# Patient Record
Sex: FEMALE | Race: WHITE | Employment: FULL TIME | URBAN - METROPOLITAN AREA
[De-identification: names, ages, dates, MRNs, and addresses within clinical notes are randomized per-mention and may not be internally consistent; named-entity substitution may affect disease eponyms.]

---

## 2024-11-19 ENCOUNTER — APPOINTMENT (OUTPATIENT)
Dept: PRIMARY CARE | Facility: CLINIC | Age: 54
End: 2024-11-19

## 2024-11-19 VITALS
HEIGHT: 62 IN | TEMPERATURE: 97.9 F | WEIGHT: 139.6 LBS | DIASTOLIC BLOOD PRESSURE: 80 MMHG | SYSTOLIC BLOOD PRESSURE: 124 MMHG | BODY MASS INDEX: 25.69 KG/M2 | HEART RATE: 81 BPM | OXYGEN SATURATION: 99 %

## 2024-11-19 DIAGNOSIS — J32.9 OTHER SINUSITIS, UNSPECIFIED CHRONICITY: ICD-10-CM

## 2024-11-19 DIAGNOSIS — F31.9 BIPOLAR 1 DISORDER (MULTI): ICD-10-CM

## 2024-11-19 DIAGNOSIS — M51.369 DEGENERATION OF INTERVERTEBRAL DISC OF LUMBAR REGION, UNSPECIFIED WHETHER PAIN PRESENT: ICD-10-CM

## 2024-11-19 DIAGNOSIS — F90.9 ATTENTION DEFICIT HYPERACTIVITY DISORDER (ADHD), UNSPECIFIED ADHD TYPE: ICD-10-CM

## 2024-11-19 DIAGNOSIS — E04.9 GOITER: ICD-10-CM

## 2024-11-19 DIAGNOSIS — G25.81 RESTLESS LEGS: ICD-10-CM

## 2024-11-19 DIAGNOSIS — J45.30 MILD PERSISTENT ASTHMA, UNSPECIFIED WHETHER COMPLICATED (HHS-HCC): ICD-10-CM

## 2024-11-19 DIAGNOSIS — Z00.00 WELLNESS EXAMINATION: ICD-10-CM

## 2024-11-19 DIAGNOSIS — Z78.0 MENOPAUSE: Primary | ICD-10-CM

## 2024-11-19 DIAGNOSIS — E03.9 ACQUIRED HYPOTHYROIDISM: ICD-10-CM

## 2024-11-19 PROBLEM — L50.8 CHRONIC URTICARIA: Status: ACTIVE | Noted: 2024-11-19

## 2024-11-19 PROCEDURE — 99213 OFFICE O/P EST LOW 20 MIN: CPT | Performed by: INTERNAL MEDICINE

## 2024-11-19 PROCEDURE — 99396 PREV VISIT EST AGE 40-64: CPT | Performed by: INTERNAL MEDICINE

## 2024-11-19 PROCEDURE — 3008F BODY MASS INDEX DOCD: CPT | Performed by: INTERNAL MEDICINE

## 2024-11-19 RX ORDER — CARBAMAZEPINE 400 MG/1
400 TABLET, EXTENDED RELEASE ORAL 2 TIMES DAILY
COMMUNITY
Start: 2023-01-20

## 2024-11-19 RX ORDER — GABAPENTIN 800 MG/1
800 TABLET ORAL 3 TIMES DAILY
COMMUNITY

## 2024-11-19 RX ORDER — AMOXICILLIN AND CLAVULANATE POTASSIUM 500; 125 MG/1; MG/1
500 TABLET, FILM COATED ORAL 3 TIMES DAILY
Qty: 21 TABLET | Refills: 0 | Status: SHIPPED | OUTPATIENT
Start: 2024-11-19 | End: 2024-11-26

## 2024-11-19 RX ORDER — ALBUTEROL SULFATE 90 UG/1
2 INHALANT RESPIRATORY (INHALATION) EVERY 4 HOURS PRN
COMMUNITY
Start: 2023-02-22

## 2024-11-19 RX ORDER — LEVOTHYROXINE SODIUM 100 UG/1
100 TABLET ORAL
COMMUNITY
Start: 2023-07-05

## 2024-11-19 RX ORDER — DEXTROAMPHETAMINE SACCHARATE, AMPHETAMINE ASPARTATE, DEXTROAMPHETAMINE SULFATE AND AMPHETAMINE SULFATE 5; 5; 5; 5 MG/1; MG/1; MG/1; MG/1
20 TABLET ORAL 3 TIMES DAILY
COMMUNITY

## 2024-11-19 ASSESSMENT — ENCOUNTER SYMPTOMS
LIGHT-HEADEDNESS: 0
ARTHRALGIAS: 0
ENDOCRINE NEGATIVE: 1
APPETITE CHANGE: 0
EYE REDNESS: 0
UNEXPECTED WEIGHT CHANGE: 0
PALPITATIONS: 0
APNEA: 0
FATIGUE: 0
EYE PAIN: 0
CHOKING: 0
SHORTNESS OF BREATH: 0
DIZZINESS: 0
NUMBNESS: 0
WHEEZING: 0
HEMATOLOGIC/LYMPHATIC NEGATIVE: 1
CHEST TIGHTNESS: 0
HEADACHES: 0
COUGH: 0
FACIAL ASYMMETRY: 0
DIAPHORESIS: 0
STRIDOR: 0
EYE ITCHING: 0
EYE DISCHARGE: 0
FEVER: 0
ALLERGIC/IMMUNOLOGIC NEGATIVE: 1
ACTIVITY CHANGE: 0
GASTROINTESTINAL NEGATIVE: 1
PSYCHIATRIC NEGATIVE: 1
PHOTOPHOBIA: 0
CHILLS: 0

## 2024-11-19 NOTE — PROGRESS NOTES
"Subjective   Patient ID: Parris Perla is a 54 y.o. female who presents for Establish Care.    HPI She is here to establish    Dr Gallo  Knees and hip pains '  Scoliosis     I have a sinus infection today     Pmhx   Goiter  Add  Asthma   Hypothyroid  Breast skin bx neg 2022    Pshx  Emani   Partial Hyst   Appy  Thyroid goiter   Head surgery    SOCHX      2 KIDS 28 24 H   DIET IS GOOD   DRINK FLUIDS   CARDIO NO   WEIGHTS NO   SLEEP IS UP AND DOWN   ALCOHOL social     Family hx  M a h   many sibs cancer   D d panc   Sis h     Pre med  Colonoscopy due    Mamm  due  Dexa due   Vaccines up to date   Derm  due   Pap due   Review of Systems   Constitutional:  Negative for activity change, appetite change, chills, diaphoresis, fatigue, fever and unexpected weight change.   HENT: Negative.     Eyes:  Negative for photophobia, pain, discharge, redness, itching and visual disturbance.   Respiratory:  Negative for apnea, cough, choking, chest tightness, shortness of breath, wheezing and stridor.    Cardiovascular:  Negative for chest pain, palpitations and leg swelling.   Gastrointestinal: Negative.    Endocrine: Negative.    Genitourinary: Negative.    Musculoskeletal:  Negative for arthralgias.   Skin: Negative.    Allergic/Immunologic: Negative.    Neurological:  Negative for dizziness, facial asymmetry, light-headedness, numbness and headaches.   Hematological: Negative.    Psychiatric/Behavioral: Negative.         Objective   /80 (BP Location: Right arm, Patient Position: Sitting, BP Cuff Size: Adult)   Pulse 81   Temp 36.6 °C (97.9 °F) (Temporal)   Ht 1.575 m (5' 2\")   Wt 63.3 kg (139 lb 9.6 oz)   SpO2 99%   BMI 25.53 kg/m²     Physical Exam  HENT:      Head: Normocephalic.      Right Ear: Tympanic membrane normal.      Nose: Nose normal.      Mouth/Throat:      Mouth: Mucous membranes are moist.   Eyes:      Pupils: Pupils are equal, round, and reactive to light.   Cardiovascular:      " Rate and Rhythm: Normal rate and regular rhythm.   Pulmonary:      Effort: Pulmonary effort is normal.   Abdominal:      General: Abdomen is flat. Bowel sounds are normal.   Musculoskeletal:         General: Normal range of motion.   Skin:     General: Skin is warm.      Capillary Refill: Capillary refill takes less than 2 seconds.   Neurological:      Mental Status: She is alert and oriented to person, place, and time.   Psychiatric:         Behavior: Behavior normal.     Slight goiter   Dental caries     Assessment/Plan   Diagnoses and all orders for this visit:  Menopause  -     BI mammo bilateral screening tomosynthesis; Future  -     XR DEXA bone density; Future  -     Referral to Gastroenterology; Future  -     US thyroid; Future  -     TSH with reflex to Free T4 if abnormal; Future  -     Anti-Thyroglobulin Antibody; Future  -     Comprehensive metabolic panel; Future  -     Urinalysis with Reflex Culture and Microscopic; Future  -     amoxicillin-pot clavulanate (Augmentin) 500-125 mg tablet; Take 1 tablet (500 mg) by mouth 3 times a day for 7 days.  -     Referral to Gynecology; Future  Mild persistent asthma, unspecified whether complicated ([facility])  Comments:  controlled  Orders:  -     BI mammo bilateral screening tomosynthesis; Future  -     Referral to Gastroenterology; Future  -     US thyroid; Future  -     TSH with reflex to Free T4 if abnormal; Future  -     Anti-Thyroglobulin Antibody; Future  -     Comprehensive metabolic panel; Future  -     Urinalysis with Reflex Culture and Microscopic; Future  -     amoxicillin-pot clavulanate (Augmentin) 500-125 mg tablet; Take 1 tablet (500 mg) by mouth 3 times a day for 7 days.  -     Referral to Gynecology; Future  Degeneration of intervertebral disc of lumbar region, unspecified whether pain present  Comments:  stable  Orders:  -     BI mammo bilateral screening tomosynthesis; Future  -     Referral to Gastroenterology; Future  -     US thyroid;  Future  -     TSH with reflex to Free T4 if abnormal; Future  -     Anti-Thyroglobulin Antibody; Future  -     Comprehensive metabolic panel; Future  -     Urinalysis with Reflex Culture and Microscopic; Future  -     amoxicillin-pot clavulanate (Augmentin) 500-125 mg tablet; Take 1 tablet (500 mg) by mouth 3 times a day for 7 days.  -     Referral to Gynecology; Future  Goiter  Comments:  lets do us  Orders:  -     BI mammo bilateral screening tomosynthesis; Future  -     Referral to Gastroenterology; Future  -     US thyroid; Future  -     TSH with reflex to Free T4 if abnormal; Future  -     Anti-Thyroglobulin Antibody; Future  -     Comprehensive metabolic panel; Future  -     Urinalysis with Reflex Culture and Microscopic; Future  -     amoxicillin-pot clavulanate (Augmentin) 500-125 mg tablet; Take 1 tablet (500 mg) by mouth 3 times a day for 7 days.  -     Referral to Gynecology; Future  Bipolar 1 disorder (Multi)  Comments:  controlled by psych  Orders:  -     BI mammo bilateral screening tomosynthesis; Future  -     Referral to Gastroenterology; Future  -     US thyroid; Future  -     TSH with reflex to Free T4 if abnormal; Future  -     Anti-Thyroglobulin Antibody; Future  -     Comprehensive metabolic panel; Future  -     Urinalysis with Reflex Culture and Microscopic; Future  -     amoxicillin-pot clavulanate (Augmentin) 500-125 mg tablet; Take 1 tablet (500 mg) by mouth 3 times a day for 7 days.  -     Referral to Gynecology; Future  Wellness examination  -     BI mammo bilateral screening tomosynthesis; Future  -     Referral to Gastroenterology; Future  -     US thyroid; Future  -     TSH with reflex to Free T4 if abnormal; Future  -     Anti-Thyroglobulin Antibody; Future  -     Lipid Panel; Future  -     Comprehensive metabolic panel; Future  -     Urinalysis with Reflex Culture and Microscopic; Future  -     amoxicillin-pot clavulanate (Augmentin) 500-125 mg tablet; Take 1 tablet (500 mg) by mouth  3 times a day for 7 days.  -     Referral to Gynecology; Future  Acquired hypothyroidism  Comments:  on meds  Orders:  -     BI mammo bilateral screening tomosynthesis; Future  -     Referral to Gastroenterology; Future  -     US thyroid; Future  -     TSH with reflex to Free T4 if abnormal; Future  -     Anti-Thyroglobulin Antibody; Future  -     Comprehensive metabolic panel; Future  -     Urinalysis with Reflex Culture and Microscopic; Future  -     amoxicillin-pot clavulanate (Augmentin) 500-125 mg tablet; Take 1 tablet (500 mg) by mouth 3 times a day for 7 days.  -     Referral to Gynecology; Future  Other sinusitis, unspecified chronicity  -     BI mammo bilateral screening tomosynthesis; Future  -     Referral to Gastroenterology; Future  -     US thyroid; Future  -     TSH with reflex to Free T4 if abnormal; Future  -     Anti-Thyroglobulin Antibody; Future  -     Comprehensive metabolic panel; Future  -     Urinalysis with Reflex Culture and Microscopic; Future  -     amoxicillin-pot clavulanate (Augmentin) 500-125 mg tablet; Take 1 tablet (500 mg) by mouth 3 times a day for 7 days.  -     Referral to Gynecology; Future  Restless legs  Comments:  controlled  Orders:  -     BI mammo bilateral screening tomosynthesis; Future  -     Referral to Gastroenterology; Future  -     US thyroid; Future  -     TSH with reflex to Free T4 if abnormal; Future  -     Anti-Thyroglobulin Antibody; Future  -     Comprehensive metabolic panel; Future  -     Urinalysis with Reflex Culture and Microscopic; Future  -     amoxicillin-pot clavulanate (Augmentin) 500-125 mg tablet; Take 1 tablet (500 mg) by mouth 3 times a day for 7 days.  -     Referral to Gynecology; Future  Attention deficit hyperactivity disorder (ADHD), unspecified ADHD type  -     Referral to Gynecology; Future  Other orders  -     Follow Up In Primary Care - Established; Future

## 2024-12-03 ENCOUNTER — HOSPITAL ENCOUNTER (OUTPATIENT)
Dept: RADIOLOGY | Facility: CLINIC | Age: 54
Discharge: HOME | End: 2024-12-03
Payer: COMMERCIAL

## 2024-12-03 DIAGNOSIS — F31.9 BIPOLAR 1 DISORDER (MULTI): ICD-10-CM

## 2024-12-03 DIAGNOSIS — G25.81 RESTLESS LEGS: ICD-10-CM

## 2024-12-03 DIAGNOSIS — Z00.00 WELLNESS EXAMINATION: ICD-10-CM

## 2024-12-03 DIAGNOSIS — J45.30 MILD PERSISTENT ASTHMA, UNSPECIFIED WHETHER COMPLICATED (HHS-HCC): ICD-10-CM

## 2024-12-03 DIAGNOSIS — J32.9 OTHER SINUSITIS, UNSPECIFIED CHRONICITY: ICD-10-CM

## 2024-12-03 DIAGNOSIS — Z78.0 MENOPAUSE: ICD-10-CM

## 2024-12-03 DIAGNOSIS — E03.9 ACQUIRED HYPOTHYROIDISM: ICD-10-CM

## 2024-12-03 DIAGNOSIS — M51.369 DEGENERATION OF INTERVERTEBRAL DISC OF LUMBAR REGION, UNSPECIFIED WHETHER PAIN PRESENT: ICD-10-CM

## 2024-12-03 DIAGNOSIS — E04.9 GOITER: ICD-10-CM

## 2024-12-03 PROCEDURE — 76536 US EXAM OF HEAD AND NECK: CPT

## 2024-12-03 PROCEDURE — 76536 US EXAM OF HEAD AND NECK: CPT | Performed by: RADIOLOGY

## 2024-12-10 ENCOUNTER — HOSPITAL ENCOUNTER (OUTPATIENT)
Dept: RADIOLOGY | Facility: CLINIC | Age: 54
Discharge: HOME | End: 2024-12-10
Payer: COMMERCIAL

## 2024-12-10 VITALS — WEIGHT: 139.55 LBS | BODY MASS INDEX: 25.68 KG/M2 | HEIGHT: 62 IN

## 2024-12-10 DIAGNOSIS — M51.369 DEGENERATION OF INTERVERTEBRAL DISC OF LUMBAR REGION, UNSPECIFIED WHETHER PAIN PRESENT: ICD-10-CM

## 2024-12-10 DIAGNOSIS — Z78.0 MENOPAUSE: ICD-10-CM

## 2024-12-10 DIAGNOSIS — E03.9 ACQUIRED HYPOTHYROIDISM: ICD-10-CM

## 2024-12-10 DIAGNOSIS — F31.9 BIPOLAR 1 DISORDER (MULTI): ICD-10-CM

## 2024-12-10 DIAGNOSIS — Z00.00 WELLNESS EXAMINATION: ICD-10-CM

## 2024-12-10 DIAGNOSIS — E04.9 THYROID GOITER: Primary | ICD-10-CM

## 2024-12-10 DIAGNOSIS — J45.30 MILD PERSISTENT ASTHMA, UNSPECIFIED WHETHER COMPLICATED (HHS-HCC): ICD-10-CM

## 2024-12-10 DIAGNOSIS — J32.9 OTHER SINUSITIS, UNSPECIFIED CHRONICITY: ICD-10-CM

## 2024-12-10 DIAGNOSIS — E04.9 GOITER: ICD-10-CM

## 2024-12-10 DIAGNOSIS — G25.81 RESTLESS LEGS: ICD-10-CM

## 2024-12-10 PROCEDURE — 77067 SCR MAMMO BI INCL CAD: CPT | Performed by: RADIOLOGY

## 2024-12-10 PROCEDURE — 77080 DXA BONE DENSITY AXIAL: CPT | Performed by: STUDENT IN AN ORGANIZED HEALTH CARE EDUCATION/TRAINING PROGRAM

## 2024-12-10 PROCEDURE — 77063 BREAST TOMOSYNTHESIS BI: CPT | Performed by: RADIOLOGY

## 2024-12-10 PROCEDURE — 77080 DXA BONE DENSITY AXIAL: CPT

## 2024-12-10 PROCEDURE — 77067 SCR MAMMO BI INCL CAD: CPT

## 2024-12-13 ENCOUNTER — HOSPITAL ENCOUNTER (OUTPATIENT)
Dept: RADIOLOGY | Facility: EXTERNAL LOCATION | Age: 54
Discharge: HOME | End: 2024-12-13

## 2024-12-16 ENCOUNTER — APPOINTMENT (OUTPATIENT)
Dept: OTOLARYNGOLOGY | Facility: CLINIC | Age: 54
End: 2024-12-16
Payer: COMMERCIAL

## 2024-12-16 VITALS — BODY MASS INDEX: 25.58 KG/M2 | WEIGHT: 139 LBS | HEIGHT: 62 IN

## 2024-12-16 DIAGNOSIS — E04.1 THYROID NODULE: ICD-10-CM

## 2024-12-16 PROCEDURE — 99204 OFFICE O/P NEW MOD 45 MIN: CPT | Performed by: STUDENT IN AN ORGANIZED HEALTH CARE EDUCATION/TRAINING PROGRAM

## 2024-12-16 PROCEDURE — 3008F BODY MASS INDEX DOCD: CPT | Performed by: STUDENT IN AN ORGANIZED HEALTH CARE EDUCATION/TRAINING PROGRAM

## 2024-12-16 ASSESSMENT — PATIENT HEALTH QUESTIONNAIRE - PHQ9
SUM OF ALL RESPONSES TO PHQ9 QUESTIONS 1 AND 2: 0
2. FEELING DOWN, DEPRESSED OR HOPELESS: NOT AT ALL
1. LITTLE INTEREST OR PLEASURE IN DOING THINGS: NOT AT ALL

## 2024-12-16 NOTE — PROGRESS NOTES
"HEAD AND NECK SURGERY CONSULT  [facility]    Referring Provider: Jackie MARCUS  I had the pleasure of seeing Parris Perla as a consultation today for evaluation of thyroid nodules.     She had her right thyroid removed at [facility] around 8-10 years ago. Per patient the pathology was benign. She was following with serial US for a left thyroid nodule, insurance changed and she is now establishing with . Last US in 2022 did not meet criteria for biopsy. She was on synthroid previously, not taking currently. Has not had recent thyroid levels checked. No new dysphagia, dysphonia, dyspnea, lumps or bumps in her neck. Having weight changes.     Denies personal history of radiation to the neck, family history of thyroid disease or cancer.     There is not a personal or family history of blood clots, easy bleeding or bruising, or anesthesia concerns.      Tobacco use: The patient  reports that she has quit smoking. Her smoking use included cigarettes. She has never used smokeless tobacco.   Alcohol Use: The patient  reports current alcohol use.     Physical Examination  Vitals:  Ht 1.575 m (5' 2\")   Wt 63 kg (139 lb)   BMI 25.42 kg/m²   General: Examination reveals a well-developed, well-nourished patient in no apparent distress.  The patient has no audible dysphonia, stridor or airway distress.  The patient is oriented, alert and responsive.    Oral Cavity:  The patient is able to open the mouth widely without trismus.  The floor of mouth and oral tongue are soft, and no mucosal abnormalities are noted on the lips or within the oral cavity.  The oral tongue is fully mobile and midline on protrusion.  The patient's teeth are intact and healthy  Oropharynx: There are no mucosal abnormalities noted within the oropharynx.  The soft palate elevates symmetrically, the tonsils and base of tongue are normal to inspection and palpation.       Salivary glands: There are no palpable masses of the parotid or submandibular " glands.   Neuro: Cranial nerves 2-12 are without obvious abnormality.  Neck: The neck is soft and symmetric.  There is no palpable adenopathy.   Thyroid: soft, nontender     DATA REVIEWED:  Labs: no recent thyroid labs    Radiology: I personally reviewed the Thyroid US from 12/3/24 demonstrating left lobe surgically absent, right lobe 5.0 cm greatest dimension, 1.6 x 1.0 x 1.0 TR4 nodule, no LAD. I also reviewed the Thyroid US from [facility] from 11/2022 which showed 1.6 x 1.0 x 1.1 cm right TR3 nodule    Review of prior medical records: I reviewed the patient's medical records which included a clinic note from Jessee Ayala MD (PCP) concern for enlarged thyroid, US and labs done    ASSESSMENT and PLAN:    Thyroid Nodule(s)  - Reviewed work up and treatment options, as well as exam findings today  - Recommend FNA of TR4 1.6 cm nodule. Reviewed that this nodule has been present since 2013 and is unlikely to be malignancy, but does meet criteria for biopsy. She understands and would like to proceed. Order placed today.  - I will call her with the results    Jaye Ricardo MD

## 2024-12-19 ENCOUNTER — LAB (OUTPATIENT)
Dept: LAB | Facility: LAB | Age: 54
End: 2024-12-19
Payer: COMMERCIAL

## 2024-12-19 DIAGNOSIS — E03.9 ACQUIRED HYPOTHYROIDISM: ICD-10-CM

## 2024-12-19 DIAGNOSIS — Z00.00 WELLNESS EXAMINATION: ICD-10-CM

## 2024-12-19 DIAGNOSIS — G25.81 RESTLESS LEGS: ICD-10-CM

## 2024-12-19 DIAGNOSIS — J32.9 OTHER SINUSITIS, UNSPECIFIED CHRONICITY: ICD-10-CM

## 2024-12-19 DIAGNOSIS — J45.30 MILD PERSISTENT ASTHMA, UNSPECIFIED WHETHER COMPLICATED (HHS-HCC): ICD-10-CM

## 2024-12-19 DIAGNOSIS — M51.369 DEGENERATION OF INTERVERTEBRAL DISC OF LUMBAR REGION, UNSPECIFIED WHETHER PAIN PRESENT: ICD-10-CM

## 2024-12-19 DIAGNOSIS — E04.9 GOITER: ICD-10-CM

## 2024-12-19 DIAGNOSIS — Z78.0 MENOPAUSE: ICD-10-CM

## 2024-12-19 DIAGNOSIS — F31.9 BIPOLAR 1 DISORDER (MULTI): ICD-10-CM

## 2024-12-19 PROCEDURE — 86800 THYROGLOBULIN ANTIBODY: CPT

## 2024-12-19 PROCEDURE — 36415 COLL VENOUS BLD VENIPUNCTURE: CPT

## 2024-12-19 PROCEDURE — 84443 ASSAY THYROID STIM HORMONE: CPT

## 2024-12-20 ENCOUNTER — LAB (OUTPATIENT)
Dept: LAB | Facility: LAB | Age: 54
End: 2024-12-20
Payer: COMMERCIAL

## 2024-12-20 DIAGNOSIS — M51.369 DEGENERATION OF INTERVERTEBRAL DISC OF LUMBAR REGION, UNSPECIFIED WHETHER PAIN PRESENT: ICD-10-CM

## 2024-12-20 DIAGNOSIS — Z00.00 WELLNESS EXAMINATION: ICD-10-CM

## 2024-12-20 DIAGNOSIS — G25.81 RESTLESS LEGS: ICD-10-CM

## 2024-12-20 DIAGNOSIS — F31.9 BIPOLAR 1 DISORDER (MULTI): ICD-10-CM

## 2024-12-20 DIAGNOSIS — J45.30 MILD PERSISTENT ASTHMA, UNSPECIFIED WHETHER COMPLICATED (HHS-HCC): ICD-10-CM

## 2024-12-20 DIAGNOSIS — J32.9 OTHER SINUSITIS, UNSPECIFIED CHRONICITY: ICD-10-CM

## 2024-12-20 DIAGNOSIS — E04.9 GOITER: ICD-10-CM

## 2024-12-20 DIAGNOSIS — Z78.0 MENOPAUSE: ICD-10-CM

## 2024-12-20 DIAGNOSIS — E03.9 ACQUIRED HYPOTHYROIDISM: ICD-10-CM

## 2024-12-20 LAB
ALBUMIN SERPL BCP-MCNC: 4.4 G/DL (ref 3.4–5)
ALP SERPL-CCNC: 103 U/L (ref 33–110)
ALT SERPL W P-5'-P-CCNC: 15 U/L (ref 7–45)
ANION GAP SERPL CALC-SCNC: 15 MMOL/L (ref 10–20)
APPEARANCE UR: CLEAR
AST SERPL W P-5'-P-CCNC: 18 U/L (ref 9–39)
BILIRUB SERPL-MCNC: 0.4 MG/DL (ref 0–1.2)
BILIRUB UR STRIP.AUTO-MCNC: NEGATIVE MG/DL
BUN SERPL-MCNC: 8 MG/DL (ref 6–23)
CALCIUM SERPL-MCNC: 9.5 MG/DL (ref 8.6–10.6)
CHLORIDE SERPL-SCNC: 103 MMOL/L (ref 98–107)
CHOLEST SERPL-MCNC: 283 MG/DL (ref 0–199)
CHOLESTEROL/HDL RATIO: 2.7
CO2 SERPL-SCNC: 29 MMOL/L (ref 21–32)
COLOR UR: ABNORMAL
CREAT SERPL-MCNC: 0.55 MG/DL (ref 0.5–1.05)
EGFRCR SERPLBLD CKD-EPI 2021: >90 ML/MIN/1.73M*2
GLUCOSE SERPL-MCNC: 112 MG/DL (ref 74–99)
GLUCOSE UR STRIP.AUTO-MCNC: NORMAL MG/DL
HDLC SERPL-MCNC: 105.3 MG/DL
HOLD SPECIMEN: NORMAL
KETONES UR STRIP.AUTO-MCNC: NEGATIVE MG/DL
LDLC SERPL CALC-MCNC: 163 MG/DL
LEUKOCYTE ESTERASE UR QL STRIP.AUTO: NEGATIVE
MUCOUS THREADS #/AREA URNS AUTO: NORMAL /LPF
NITRITE UR QL STRIP.AUTO: NEGATIVE
NON HDL CHOLESTEROL: 178 MG/DL (ref 0–149)
PH UR STRIP.AUTO: 6 [PH]
POTASSIUM SERPL-SCNC: 4.9 MMOL/L (ref 3.5–5.3)
PROT SERPL-MCNC: 6.9 G/DL (ref 6.4–8.2)
PROT UR STRIP.AUTO-MCNC: NEGATIVE MG/DL
RBC # UR STRIP.AUTO: ABNORMAL /UL
RBC #/AREA URNS AUTO: NORMAL /HPF
SODIUM SERPL-SCNC: 142 MMOL/L (ref 136–145)
SP GR UR STRIP.AUTO: 1.02
SQUAMOUS #/AREA URNS AUTO: NORMAL /HPF
TRIGL SERPL-MCNC: 74 MG/DL (ref 0–149)
TSH SERPL-ACNC: 3.9 MIU/L (ref 0.44–3.98)
UROBILINOGEN UR STRIP.AUTO-MCNC: NORMAL MG/DL
VLDL: 15 MG/DL (ref 0–40)
WBC #/AREA URNS AUTO: NORMAL /HPF

## 2024-12-20 PROCEDURE — 80053 COMPREHEN METABOLIC PANEL: CPT

## 2024-12-20 PROCEDURE — 80061 LIPID PANEL: CPT

## 2024-12-20 PROCEDURE — 36415 COLL VENOUS BLD VENIPUNCTURE: CPT

## 2024-12-20 PROCEDURE — 81001 URINALYSIS AUTO W/SCOPE: CPT

## 2024-12-21 LAB — THYROGLOB AB SERPL-ACNC: <0.9 IU/ML (ref 0–4)

## 2025-01-03 ENCOUNTER — HOSPITAL ENCOUNTER (OUTPATIENT)
Dept: RADIOLOGY | Facility: HOSPITAL | Age: 55
Discharge: HOME | End: 2025-01-03
Payer: COMMERCIAL

## 2025-01-03 DIAGNOSIS — E04.1 THYROID NODULE: ICD-10-CM

## 2025-01-03 PROCEDURE — 60100 BIOPSY OF THYROID: CPT

## 2025-01-03 NOTE — POST-PROCEDURE NOTE
Interventional Radiology Brief Postprocedure Note    Attending: Yasmani Winkler MD      Assistant: none    Diagnosis: thyroid nodule    Description of procedure: thyroid fna     Anesthesia:  Local    Complications: None    Estimated Blood Loss: none      specimens collected      See detailed result report with images in PACS.    The patient tolerated the procedure well without incident or complication and is in stable condition.

## 2025-01-06 LAB
LABORATORY COMMENT REPORT: NORMAL
LABORATORY COMMENT REPORT: NORMAL
PATH REPORT.COMMENTS IMP SPEC: NORMAL
PATH REPORT.FINAL DX SPEC: NORMAL
PATH REPORT.GROSS SPEC: NORMAL
PATH REPORT.RELEVANT HX SPEC: NORMAL
PATH REPORT.TOTAL CANCER: NORMAL

## 2025-01-07 ENCOUNTER — TELEPHONE (OUTPATIENT)
Dept: OTOLARYNGOLOGY | Facility: HOSPITAL | Age: 55
End: 2025-01-07
Payer: COMMERCIAL

## 2025-01-07 NOTE — TELEPHONE ENCOUNTER
Called patient to discuss thyroid biopsy results, no signs of malignancy. Findings consistent with hashimotos thyroiditis. No indications for surgical excision of the thyroid at this time. Recent TSH upper limits of normal, history of being on levothyroxine, none recently. She would like to follow with her PCP for annual labs and repeat thyroid US in one year. Questions answered.

## 2025-01-28 ENCOUNTER — APPOINTMENT (OUTPATIENT)
Dept: OBSTETRICS AND GYNECOLOGY | Facility: CLINIC | Age: 55
End: 2025-01-28
Payer: COMMERCIAL

## 2025-03-31 ENCOUNTER — APPOINTMENT (OUTPATIENT)
Dept: PRIMARY CARE | Facility: CLINIC | Age: 55
End: 2025-03-31
Payer: COMMERCIAL

## 2025-03-31 DIAGNOSIS — E03.9 HYPOTHYROIDISM, UNSPECIFIED TYPE: ICD-10-CM

## 2025-03-31 DIAGNOSIS — R19.7 DIARRHEA OF PRESUMED INFECTIOUS ORIGIN: Primary | ICD-10-CM

## 2025-03-31 DIAGNOSIS — R63.4 WEIGHT LOSS: ICD-10-CM

## 2025-03-31 DIAGNOSIS — Z00.00 WELLNESS EXAMINATION: ICD-10-CM

## 2025-03-31 PROCEDURE — 99213 OFFICE O/P EST LOW 20 MIN: CPT | Performed by: INTERNAL MEDICINE

## 2025-03-31 NOTE — PROGRESS NOTES
Subjective   Patient ID: Parris Perla is a 54 y.o. female who presents for No chief complaint on file..    HPI Nov. Diarrhea , viral syndrome   Since then weight loss loose stools   No fever no night sweats    Review of Systems    Objective   There were no vitals taken for this visit.    Physical Exam  NAD  Assessment/Plan   Diagnoses and all orders for this visit:  Diarrhea of presumed infectious origin  Comments:  refer for colon  labs  Weight loss  Comments:  labs

## 2025-04-01 ENCOUNTER — OFFICE VISIT (OUTPATIENT)
Facility: CLINIC | Age: 55
End: 2025-04-01
Payer: COMMERCIAL

## 2025-04-01 VITALS — WEIGHT: 128 LBS | HEIGHT: 62 IN | BODY MASS INDEX: 23.55 KG/M2 | HEART RATE: 77 BPM

## 2025-04-01 DIAGNOSIS — R19.7 DIARRHEA, UNSPECIFIED TYPE: Primary | ICD-10-CM

## 2025-04-01 DIAGNOSIS — F31.9 BIPOLAR 1 DISORDER (MULTI): ICD-10-CM

## 2025-04-01 DIAGNOSIS — Z78.0 MENOPAUSE: ICD-10-CM

## 2025-04-01 DIAGNOSIS — E03.9 ACQUIRED HYPOTHYROIDISM: ICD-10-CM

## 2025-04-01 DIAGNOSIS — R63.4 ABNORMAL WEIGHT LOSS: ICD-10-CM

## 2025-04-01 DIAGNOSIS — Z12.11 SPECIAL SCREENING FOR MALIGNANT NEOPLASMS, COLON: ICD-10-CM

## 2025-04-01 LAB
ALBUMIN SERPL-MCNC: 4.2 G/DL (ref 3.6–5.1)
ALP SERPL-CCNC: 95 U/L (ref 37–153)
ALT SERPL-CCNC: 11 U/L (ref 6–29)
ANION GAP SERPL CALCULATED.4IONS-SCNC: 9 MMOL/L (CALC) (ref 7–17)
AST SERPL-CCNC: 15 U/L (ref 10–35)
BILIRUB SERPL-MCNC: 0.3 MG/DL (ref 0.2–1.2)
BUN SERPL-MCNC: 13 MG/DL (ref 7–25)
CALCIUM SERPL-MCNC: 8.8 MG/DL (ref 8.6–10.4)
CHLORIDE SERPL-SCNC: 104 MMOL/L (ref 98–110)
CO2 SERPL-SCNC: 26 MMOL/L (ref 20–32)
CREAT SERPL-MCNC: 0.57 MG/DL (ref 0.5–1.03)
EGFRCR SERPLBLD CKD-EPI 2021: 108 ML/MIN/1.73M2
ERYTHROCYTE [DISTWIDTH] IN BLOOD BY AUTOMATED COUNT: 13 % (ref 11–15)
GLUCOSE SERPL-MCNC: 90 MG/DL (ref 65–139)
HCT VFR BLD AUTO: 37.4 % (ref 35–45)
HGB BLD-MCNC: 12.7 G/DL (ref 11.7–15.5)
MCH RBC QN AUTO: 30.3 PG (ref 27–33)
MCHC RBC AUTO-ENTMCNC: 34 G/DL (ref 32–36)
MCV RBC AUTO: 89.3 FL (ref 80–100)
PLATELET # BLD AUTO: 223 THOUSAND/UL (ref 140–400)
PMV BLD REES-ECKER: 10.2 FL (ref 7.5–12.5)
POTASSIUM SERPL-SCNC: 4.4 MMOL/L (ref 3.5–5.3)
PROT SERPL-MCNC: 6.3 G/DL (ref 6.1–8.1)
RBC # BLD AUTO: 4.19 MILLION/UL (ref 3.8–5.1)
SODIUM SERPL-SCNC: 139 MMOL/L (ref 135–146)
T4 FREE SERPL-MCNC: 0.8 NG/DL (ref 0.8–1.8)
TSH SERPL-ACNC: 7.41 MIU/L
WBC # BLD AUTO: 4.6 THOUSAND/UL (ref 3.8–10.8)

## 2025-04-01 PROCEDURE — 99204 OFFICE O/P NEW MOD 45 MIN: CPT | Performed by: INTERNAL MEDICINE

## 2025-04-01 PROCEDURE — 3008F BODY MASS INDEX DOCD: CPT | Performed by: INTERNAL MEDICINE

## 2025-04-01 PROCEDURE — 1036F TOBACCO NON-USER: CPT | Performed by: INTERNAL MEDICINE

## 2025-04-01 RX ORDER — SODIUM, POTASSIUM,MAG SULFATES 17.5-3.13G
SOLUTION, RECONSTITUTED, ORAL ORAL
Qty: 1 EACH | Refills: 0 | Status: SHIPPED | OUTPATIENT
Start: 2025-04-01

## 2025-04-01 NOTE — PROGRESS NOTES
Primary Care Provider: Jessee Mejia MD  Referring Provider: Jessee Mejia MD  My final recommendations will be communicated back to the referring physician and/or primary care provider via shared medical records or via US mail.    Chief Complaint (Reason for visit):   Parris Perla is a 54 y.o. female who presents for diarrhea and weight loss    ASSESSMENT AND PLAN     Assessment & Plan  Diarrhea, unspecified type  Patient has been having postprandial diarrhea since Thanksgiving 2024.  This is a new change for her.  She was admitted to the hospital and 11/2024 for nausea vomiting and diarrhea.  Since then she has postprandial diarrhea.  She has also lost about 20 pounds in the last 4 months.  All of this is unintentional weight loss    Patient has Hashimoto's thyroiditis, her TSH is slightly elevated and free T4 is normal.  She is following up with an endocrinologist and her PCP    Other possible differentials for weight loss could be Adderall and other medications    She is s/p cholecystectomy    -I will check her stool to rule out infections  -I will also check pancreatic elastase and fecal fat to rule out causes of malabsorption  - EGD with duodenal biopsies and colonoscopy with biopsies  -I advised her to get up to date with age recommended cancer screening  -I will also order a CT scan due to concerning weight loss      Orders:    Ova/Para + Giardia/Cryptosporidium Antigen; Future    Stool Pathogen Panel, PCR; Future    C. difficile, PCR; Future    Pancreatic Elastase, Fecal; Future    Fecal Fat, Qualitative; Future    Colonoscopy Diagnostic; Future    Esophagogastroduodenoscopy (EGD); Future    CT abdomen pelvis w IV contrast; Future    Abnormal weight loss    Orders:    Ova/Para + Giardia/Cryptosporidium Antigen; Future    Stool Pathogen Panel, PCR; Future    C. difficile, PCR; Future    Pancreatic Elastase, Fecal; Future    Fecal Fat, Qualitative; Future    Colonoscopy Diagnostic; Future     Esophagogastroduodenoscopy (EGD); Future    CT abdomen pelvis w IV contrast; Future    Menopause    Orders:    Referral to Gastroenterology    Bipolar 1 disorder (Multi)    Orders:    Referral to Gastroenterology    Acquired hypothyroidism    Orders:    Referral to Gastroenterology      I discussed the risks, benefits and alternative(s) of the procedure(s) with the patient. Pt verbalizes understanding and is willing to proceed. All questions were answered.    Mary Pete MD, MS    SUBJECTIVE   HPI: History obtained from patient    54-year-old female who comes to see me for diarrhea and weight loss    Patient has been having postprandial diarrhea since 2024.  This is a new change for her.  She was in ER 2024 for nausea vomiting and diarrhea.  Since then she has postprandial diarrhea.  She has also lost about 20 pounds in the last 4 months.  All of this is unintentional weight loss    Risk factors  + S/p cholecystectomy  Patient has Hashimoto's thyroiditis, her TSH is slightly elevated and free T4 is normal.  She is following up with an endocrinologist and her PCP    Diarrhea Characteristics  -Patient denies any nocturnal awakening  -Patient denies any correlation of bowel movements with food  -Patient denies any blood or mucus in stool.    - Patient denies any fevers/chills/night sweats/unintentional weight loss  -Patient denies any fluffy stool or oily shin to the stools    Past Medical History:   Diagnosis Date    ADHD (attention deficit hyperactivity disorder)     Bipolar 1 disorder (Multi)     Restless leg syndrome        Past Surgical History:   Procedure Laterality Date    APPENDECTOMY      BREAST CYST ASPIRATION Bilateral 2020    benign cyst aspirations     SECTION, LOW TRANSVERSE      DILATION AND CURETTAGE OF UTERUS      GALLBLADDER SURGERY      THYROIDECTOMY         Current Outpatient Medications   Medication Sig Dispense Refill    amphetamine-dextroamphetamine (Adderall) 20 mg  "tablet Take 1 tablet (20 mg) by mouth 3 times a day.      carBAMazepine XR (TEGretol  XR) 400 mg 12 hr tablet Take 1 tablet (400 mg) by mouth 2 times a day.      gabapentin (Neurontin) 800 mg tablet Take 1 tablet (800 mg) by mouth 3 times a day.      levothyroxine (Synthroid, Levoxyl) 100 mcg tablet Take 1 tablet (100 mcg) by mouth once daily. (Patient not taking: Reported on 4/1/2025)       No current facility-administered medications for this visit.       Allergies   Allergen Reactions    Betamethasone Other     Had severe reaction to lumbar injection Hives, multi-systemic sickness. Unsure if she is allergic to oral steroids.     OBJECTIVE   PHYSICAL EXAM:  Pulse 77   Ht 1.575 m (5' 2\")   Wt 58.1 kg (128 lb)   BMI 23.41 kg/m²      LABS/IMAGING/SCOPES  Lab Results   Component Value Date    WBC 4.6 03/31/2025    HGB 12.7 03/31/2025    HCT 37.4 03/31/2025    MCV 89.3 03/31/2025     03/31/2025     Lab Results   Component Value Date    GLUCOSE 90 03/31/2025    CALCIUM 8.8 03/31/2025     03/31/2025    K 4.4 03/31/2025    CO2 26 03/31/2025     03/31/2025    BUN 13 03/31/2025    CREATININE 0.57 03/31/2025     Lab Results   Component Value Date    ALT 11 03/31/2025    AST 15 03/31/2025    ALKPHOS 95 03/31/2025    BILITOT 0.3 03/31/2025           Mary Pete MD, MS  "

## 2025-04-01 NOTE — LETTER
April 1, 2025     Jessee Mejia MD  [address] [facility] [place] [address]    Patient: Parris Perla   YOB: 1970   Date of Visit: 4/1/2025       Dear Dr. Jessee Mejia MD:    Thank you for referring Parris Perla to me for evaluation. Below are my notes for this consultation.  If you have questions, please do not hesitate to call me. I look forward to following your patient along with you.       Sincerely,     Mary Pete MD, MS      CC: No Recipients  ______________________________________________________________________________________    Primary Care Provider: Jessee Mejia MD  Referring Provider: Jessee Mejia MD  My final recommendations will be communicated back to the referring physician and/or primary care provider via shared medical records or via US mail.    Chief Complaint (Reason for visit):   Parris Perla is a 54 y.o. female who presents for diarrhea and weight loss    ASSESSMENT AND PLAN     Assessment & Plan  Diarrhea, unspecified type  Patient has been having postprandial diarrhea since Thanksgiving 2024.  This is a new change for her.  She was admitted to the hospital and 11/2024 for nausea vomiting and diarrhea.  Since then she has postprandial diarrhea.  She has also lost about 20 pounds in the last 4 months.  All of this is unintentional weight loss    Patient has Hashimoto's thyroiditis, her TSH is slightly elevated and free T4 is normal.  She is following up with an endocrinologist and her PCP    Other possible differentials for weight loss could be Adderall and other medications    She is s/p cholecystectomy    -I will check her stool to rule out infections  -I will also check pancreatic elastase and fecal fat to rule out causes of malabsorption  - EGD with duodenal biopsies and colonoscopy with biopsies  -I advised her to get up to date with age recommended cancer screening  -I will also order a CT scan due to concerning weight loss      Orders:  •  Ova/Para +  Giardia/Cryptosporidium Antigen; Future  •  Stool Pathogen Panel, PCR; Future  •  C. difficile, PCR; Future  •  Pancreatic Elastase, Fecal; Future  •  Fecal Fat, Qualitative; Future  •  Colonoscopy Diagnostic; Future  •  Esophagogastroduodenoscopy (EGD); Future  •  CT abdomen pelvis w IV contrast; Future    Abnormal weight loss    Orders:  •  Ova/Para + Giardia/Cryptosporidium Antigen; Future  •  Stool Pathogen Panel, PCR; Future  •  C. difficile, PCR; Future  •  Pancreatic Elastase, Fecal; Future  •  Fecal Fat, Qualitative; Future  •  Colonoscopy Diagnostic; Future  •  Esophagogastroduodenoscopy (EGD); Future  •  CT abdomen pelvis w IV contrast; Future    Menopause    Orders:  •  Referral to Gastroenterology    Bipolar 1 disorder (Multi)    Orders:  •  Referral to Gastroenterology    Acquired hypothyroidism    Orders:  •  Referral to Gastroenterology      I discussed the risks, benefits and alternative(s) of the procedure(s) with the patient. Pt verbalizes understanding and is willing to proceed. All questions were answered.    Mary Pete MD, MS    SUBJECTIVE   HPI: History obtained from patient    54-year-old female who comes to see me for diarrhea and weight loss    Patient has been having postprandial diarrhea since Thanksgiving 2024.  This is a new change for her.  She was in ER 11/2024 for nausea vomiting and diarrhea.  Since then she has postprandial diarrhea.  She has also lost about 20 pounds in the last 4 months.  All of this is unintentional weight loss    Risk factors  + S/p cholecystectomy  Patient has Hashimoto's thyroiditis, her TSH is slightly elevated and free T4 is normal.  She is following up with an endocrinologist and her PCP    Diarrhea Characteristics  -Patient denies any nocturnal awakening  -Patient denies any correlation of bowel movements with food  -Patient denies any blood or mucus in stool.    - Patient denies any fevers/chills/night sweats/unintentional weight loss  -Patient denies  "any fluffy stool or oily shin to the stools    Past Medical History:   Diagnosis Date   • ADHD (attention deficit hyperactivity disorder)    • Bipolar 1 disorder (Multi)    • Restless leg syndrome        Past Surgical History:   Procedure Laterality Date   • APPENDECTOMY     • BREAST CYST ASPIRATION Bilateral 2020    benign cyst aspirations   •  SECTION, LOW TRANSVERSE     • DILATION AND CURETTAGE OF UTERUS     • GALLBLADDER SURGERY     • THYROIDECTOMY         Current Outpatient Medications   Medication Sig Dispense Refill   • amphetamine-dextroamphetamine (Adderall) 20 mg tablet Take 1 tablet (20 mg) by mouth 3 times a day.     • carBAMazepine XR (TEGretol  XR) 400 mg 12 hr tablet Take 1 tablet (400 mg) by mouth 2 times a day.     • gabapentin (Neurontin) 800 mg tablet Take 1 tablet (800 mg) by mouth 3 times a day.     • levothyroxine (Synthroid, Levoxyl) 100 mcg tablet Take 1 tablet (100 mcg) by mouth once daily. (Patient not taking: Reported on 2025)       No current facility-administered medications for this visit.       Allergies   Allergen Reactions   • Betamethasone Other     Had severe reaction to lumbar injection Hives, multi-systemic sickness. Unsure if she is allergic to oral steroids.     OBJECTIVE   PHYSICAL EXAM:  Pulse 77   Ht 1.575 m (5' 2\")   Wt 58.1 kg (128 lb)   BMI 23.41 kg/m²      LABS/IMAGING/SCOPES  Lab Results   Component Value Date    WBC 4.6 2025    HGB 12.7 2025    HCT 37.4 2025    MCV 89.3 2025     2025     Lab Results   Component Value Date    GLUCOSE 90 2025    CALCIUM 8.8 2025     2025    K 4.4 2025    CO2 26 2025     2025    BUN 13 2025    CREATININE 0.57 2025     Lab Results   Component Value Date    ALT 11 2025    AST 15 2025    ALKPHOS 95 2025    BILITOT 0.3 2025           Mary Pete MD, MS  "

## 2025-04-04 ENCOUNTER — TELEPHONE (OUTPATIENT)
Dept: PRIMARY CARE | Facility: CLINIC | Age: 55
End: 2025-04-04
Payer: COMMERCIAL

## 2025-04-04 DIAGNOSIS — A04.72 C. DIFFICILE COLITIS: Primary | ICD-10-CM

## 2025-04-04 LAB
C COLI+JEJUNI+LARI FUSA STL QL NAA+PROBE: NOT DETECTED
C DIFF TOX GENS STL QL NAA+PROBE: DETECTED
CRYPTOSP AG STL QL IA: NORMAL
EC STX1 GENE STL QL NAA+PROBE: NOT DETECTED
EC STX2 GENE STL QL NAA+PROBE: NOT DETECTED
ELASTASE PANC STL-MCNT: NORMAL UG/G
FAT STL SUDAN IV STN: NORMAL
G LAMBLIA AG STL QL IA: NORMAL
NOROV GI+II ORF1-ORF2 JNC STL QL NAA+PR: NOT DETECTED
O+P STL CONC: NORMAL
O+P STL TRI STN: NORMAL
RVA NSP5 STL QL NAA+PROBE: NOT DETECTED
SALMONELLA SP RPOD STL QL NAA+PROBE: NOT DETECTED
SHIGELLA DNA SPEC QL NAA+PROBE: NOT DETECTED
V CHOL+PARA RFBL+TRKH+TNAA STL QL NAA+PR: NOT DETECTED
Y ENTERO RECN STL QL NAA+PROBE: NOT DETECTED

## 2025-04-04 RX ORDER — METRONIDAZOLE 500 MG/1
500 TABLET ORAL 3 TIMES DAILY
Qty: 21 TABLET | Refills: 0 | Status: SHIPPED | OUTPATIENT
Start: 2025-04-04 | End: 2025-04-11

## 2025-04-04 NOTE — RESULT ENCOUNTER NOTE
Dear Ms. Perla:    As discussed on phone, I will prescribe you vancomycin tabs. Please take that in stead of metronidazole. And please follow other C.diff precautions as mentioned below    ?Wash your hands frequently with soap and water, especially after you use the bathroom and before you eat. Do not rely on alcohol-based hand rubs, because they have not been proven to prevent the spread of C. difficile.  ?Follow the rules about washing hands and wearing gloves if you visit someone in the hospital who has C. difficile infection  ?Stay home from work and school until you stop having diarrhea  ?Do not cook food for others while you have diarrhea  ?Take extra care while cleaning:  ·You can use a bleach-based  or make your own by mixing 1 cup (240 mL) bleach with 10 cups (2365 mL) of water to clean any hard surfaces in your home  ·Clean the bathroom last, after you clean the other spaces in your home  ·Be sure to clean doorknobs and the flushing handle on your toilet  ·Use paper towels when cleaning  ·Wash towels, bath mats, rugs, and shower curtains often. Add bleach if possible.  ·Wash and dry clothes on hottest setting possible    Mary Pete MD [facility]  Phone: [phone], option [phone]  Fax: [phone]

## 2025-04-04 NOTE — TELEPHONE ENCOUNTER
----- Message from Jessee Mejia sent at 4/4/2025 10:45 AM EDT -----  She has C Cassandra I sent in atb she has me and another dr looking at this she needs to make sure everyone is on the same page

## 2025-04-08 DIAGNOSIS — A04.72 C. DIFFICILE COLITIS: Primary | ICD-10-CM

## 2025-04-08 RX ORDER — VANCOMYCIN HYDROCHLORIDE 125 MG/1
125 CAPSULE ORAL 4 TIMES DAILY
Qty: 56 CAPSULE | Refills: 0 | Status: SHIPPED | OUTPATIENT
Start: 2025-04-08 | End: 2025-04-22

## 2025-04-11 LAB
C DIFF TOX GENS STL QL NAA+PROBE: DETECTED
CRYPTOSP AG STL QL IA: NORMAL
ELASTASE PANC STL-MCNT: 582 MCG/G
FAT STL SUDAN IV STN: NORMAL
G LAMBLIA AG STL QL IA: NORMAL
O+P STL CONC: NORMAL
O+P STL TRI STN: NORMAL

## 2025-04-14 ENCOUNTER — HOSPITAL ENCOUNTER (OUTPATIENT)
Dept: RADIOLOGY | Facility: CLINIC | Age: 55
Discharge: HOME | End: 2025-04-14
Payer: COMMERCIAL

## 2025-04-14 DIAGNOSIS — R19.7 DIARRHEA, UNSPECIFIED TYPE: ICD-10-CM

## 2025-04-14 DIAGNOSIS — R63.4 ABNORMAL WEIGHT LOSS: ICD-10-CM

## 2025-04-14 PROCEDURE — 2550000001 HC RX 255 CONTRASTS: Performed by: INTERNAL MEDICINE

## 2025-04-14 PROCEDURE — 74177 CT ABD & PELVIS W/CONTRAST: CPT | Performed by: RADIOLOGY

## 2025-04-14 PROCEDURE — 74177 CT ABD & PELVIS W/CONTRAST: CPT

## 2025-04-14 RX ADMIN — IOHEXOL 75 ML: 350 INJECTION, SOLUTION INTRAVENOUS at 10:27

## 2025-04-23 LAB
C DIFF TOX GENS STL QL NAA+PROBE: DETECTED
CRYPTOSP AG STL QL IA: NORMAL
ELASTASE PANC STL-MCNT: 582 MCG/G
FAT STL SUDAN IV STN: NORMAL
G LAMBLIA AG STL QL IA: NORMAL
O+P STL TRI STN: NORMAL

## 2025-04-30 ENCOUNTER — APPOINTMENT (OUTPATIENT)
Dept: GASTROENTEROLOGY | Facility: EXTERNAL LOCATION | Age: 55
End: 2025-04-30
Payer: COMMERCIAL

## 2025-04-30 DIAGNOSIS — R63.4 ABNORMAL WEIGHT LOSS: ICD-10-CM

## 2025-04-30 DIAGNOSIS — R19.7 DIARRHEA, UNSPECIFIED TYPE: ICD-10-CM

## 2025-04-30 DIAGNOSIS — R10.13 EPIGASTRIC PAIN: Primary | ICD-10-CM

## 2025-04-30 DIAGNOSIS — K29.70 GASTRITIS WITHOUT BLEEDING, UNSPECIFIED CHRONICITY, UNSPECIFIED GASTRITIS TYPE: ICD-10-CM

## 2025-04-30 DIAGNOSIS — D12.8 BENIGN NEOPLASM OF RECTUM: ICD-10-CM

## 2025-04-30 PROCEDURE — 45385 COLONOSCOPY W/LESION REMOVAL: CPT | Performed by: INTERNAL MEDICINE

## 2025-04-30 PROCEDURE — 45380 COLONOSCOPY AND BIOPSY: CPT | Performed by: INTERNAL MEDICINE

## 2025-04-30 PROCEDURE — 43239 EGD BIOPSY SINGLE/MULTIPLE: CPT | Performed by: INTERNAL MEDICINE

## 2025-05-01 PROCEDURE — 0753T DGTZ GLS MCRSCP SLD LEVEL IV: CPT

## 2025-05-01 PROCEDURE — 88305 TISSUE EXAM BY PATHOLOGIST: CPT | Performed by: PATHOLOGY

## 2025-05-01 PROCEDURE — 88305 TISSUE EXAM BY PATHOLOGIST: CPT

## 2025-05-02 ENCOUNTER — LAB REQUISITION (OUTPATIENT)
Dept: LAB | Facility: HOSPITAL | Age: 55
End: 2025-05-02
Payer: COMMERCIAL

## 2025-05-02 DIAGNOSIS — R19.7 DIARRHEA, UNSPECIFIED: ICD-10-CM

## 2025-05-13 ENCOUNTER — APPOINTMENT (OUTPATIENT)
Facility: CLINIC | Age: 55
End: 2025-05-13
Payer: COMMERCIAL

## 2025-05-13 LAB
LABORATORY COMMENT REPORT: NORMAL
PATH REPORT.FINAL DX SPEC: NORMAL
PATH REPORT.GROSS SPEC: NORMAL
PATH REPORT.RELEVANT HX SPEC: NORMAL
PATH REPORT.TOTAL CANCER: NORMAL

## 2025-05-20 ENCOUNTER — APPOINTMENT (OUTPATIENT)
Dept: DERMATOLOGY | Facility: CLINIC | Age: 55
End: 2025-05-20
Payer: COMMERCIAL

## 2025-05-20 DIAGNOSIS — L30.8 OTHER ECZEMA: Primary | ICD-10-CM

## 2025-05-20 PROCEDURE — 99204 OFFICE O/P NEW MOD 45 MIN: CPT | Performed by: DERMATOLOGY

## 2025-05-20 PROCEDURE — 1036F TOBACCO NON-USER: CPT | Performed by: DERMATOLOGY

## 2025-05-20 RX ORDER — BETAMETHASONE DIPROPIONATE 0.5 MG/G
CREAM TOPICAL
Qty: 45 G | Refills: 0 | Status: SHIPPED | OUTPATIENT
Start: 2025-05-20

## 2025-05-20 NOTE — Clinical Note
Patient reports that approximately 15 years ago, received injections for their back with resultant severe allergic reaction that caused hives for 2 years duration and triggered multiple autoimmune conditions as well as eczema. The patient's eczema is episodic and usually affects the R forearm;  only once has also affected the L forearm.  -Patient notes flares with significant stress  -Patient states that betamethasone/clotrimazole cream resolves the flares usually within 3 days time  -Recommend to continue betamethasone twice daily as needed for flares  -Patient has allergy to betamethasone listed in chart which patient confirms. Patient states that they tolerate topical betamethasone without incident and requests this refill of betamethasone topically.  -Will try without the clotrimazole/anti fungal component    -Discussed with/information given to the patient on the risks, benefits and alternatives of the usage of topical corticosteroids, including but not limited to: atrophy (thinning of the skin), striae (stretch marks), telangiectasia (blood vessel growth), and dyspigmentation (discoloration of the skin).  -Recommend to limit long-term use of topical corticosteroids to less than 14 days per month to reduce risk of side effects.

## 2025-05-20 NOTE — PROGRESS NOTES
Subjective     Parris Perla is a 55 y.o. female who presents for the following: Eczema (Present for 15 years.  Not currently using any medication. Recent severe flare, since resolved, pt recently had a death in family and illness. Pt states she does not currently have any flares.).     Review of Systems:  No other skin or systemic complaints other than what is documented elsewhere in the note.    The following portions of the chart were reviewed this encounter and updated as appropriate:   Tobacco  Allergies  Meds  Problems  Med Hx  Surg Hx  Fam Hx         Skin Cancer History  Biopsy Log Book  No skin cancers from Specimen Tracking.    Additional History         Objective   Well appearing patient in no apparent distress; mood and affect are within normal limits.    A focused skin examination was performed. All findings within normal limits unless otherwise noted below.    Assessment/Plan   Skin Exam  1. OTHER ECZEMA  Right Forearm - Posterior  Xerosis; by history, intermittent flares of erythematous, eczematous patches and plaques  Patient reports that approximately 15 years ago, received injections for their back with resultant severe allergic reaction that caused hives for 2 years duration and triggered multiple autoimmune conditions as well as eczema. The patient's eczema is episodic and usually affects the R forearm;  only once has also affected the L forearm.  -Patient notes flares with significant stress  -Patient states that betamethasone/clotrimazole cream resolves the flares usually within 3 days time  -Recommend to continue betamethasone twice daily as needed for flares  -Patient has allergy to betamethasone listed in chart which patient confirms. Patient states that they tolerate topical betamethasone without incident and requests this refill of betamethasone topically.  -Will try without the clotrimazole/anti fungal component    -Discussed with/information given to the patient on the risks,  benefits and alternatives of the usage of topical corticosteroids, including but not limited to: atrophy (thinning of the skin), striae (stretch marks), telangiectasia (blood vessel growth), and dyspigmentation (discoloration of the skin).  -Recommend to limit long-term use of topical corticosteroids to less than 14 days per month to reduce risk of side effects.    Related Medications  betamethasone dipropionate 0.05 % cream  Apply to affected areas on the arm twice daily when active as needed. Use less than 14 days per month.    Follow up as needed. Patient declines FSE.  Discussed if there are any changes or development of concerning symptoms (lesion/skin condition is changing, bleeding, enlarging, or worsening) the patient is to contact my office. The patient verbalizes understanding.    Alexa Nails MD  5/20/2025

## 2025-06-03 ENCOUNTER — APPOINTMENT (OUTPATIENT)
Dept: PRIMARY CARE | Facility: CLINIC | Age: 55
End: 2025-06-03
Payer: COMMERCIAL

## 2025-06-09 DIAGNOSIS — R19.7 DIARRHEA, UNSPECIFIED TYPE: Primary | ICD-10-CM

## 2025-06-13 LAB — C DIFF TOX GENS STL QL NAA+PROBE: NOT DETECTED

## 2025-06-15 LAB
C COLI+JEJUNI+LARI FUSA STL QL NAA+PROBE: NOT DETECTED
CRYPTOSP AG STL QL IA: NORMAL
EC STX1 GENE STL QL NAA+PROBE: NOT DETECTED
EC STX2 GENE STL QL NAA+PROBE: NOT DETECTED
G LAMBLIA AG STL QL IA: NORMAL
NOROV GI+II ORF1-ORF2 JNC STL QL NAA+PR: NOT DETECTED
O+P STL CONC: NORMAL
O+P STL TRI STN: NORMAL
RVA NSP5 STL QL NAA+PROBE: NOT DETECTED
SALMONELLA SP RPOD STL QL NAA+PROBE: NOT DETECTED
SHIGELLA DNA SPEC QL NAA+PROBE: NOT DETECTED
V CHOL+PARA RFBL+TRKH+TNAA STL QL NAA+PR: NOT DETECTED
Y ENTERO RECN STL QL NAA+PROBE: NOT DETECTED

## 2025-06-25 LAB
C COLI+JEJUNI+LARI FUSA STL QL NAA+PROBE: NOT DETECTED
CRYPTOSP AG STL QL IA: NORMAL
EC STX1 GENE STL QL NAA+PROBE: NOT DETECTED
EC STX2 GENE STL QL NAA+PROBE: NOT DETECTED
G LAMBLIA AG STL QL IA: NORMAL
NOROV GI+II ORF1-ORF2 JNC STL QL NAA+PR: NOT DETECTED
O+P STL TRI STN: NORMAL
RVA NSP5 STL QL NAA+PROBE: NOT DETECTED
SALMONELLA SP RPOD STL QL NAA+PROBE: NOT DETECTED
SHIGELLA DNA SPEC QL NAA+PROBE: NOT DETECTED
V CHOL+PARA RFBL+TRKH+TNAA STL QL NAA+PR: NOT DETECTED
Y ENTERO RECN STL QL NAA+PROBE: NOT DETECTED

## 2025-07-16 ENCOUNTER — TELEMEDICINE (OUTPATIENT)
Dept: PRIMARY CARE | Facility: CLINIC | Age: 55
End: 2025-07-16
Payer: COMMERCIAL

## 2025-07-16 DIAGNOSIS — R68.89 FLU-LIKE SYMPTOMS: Primary | ICD-10-CM

## 2025-07-16 DIAGNOSIS — K04.7 TOOTH ABSCESS: ICD-10-CM

## 2025-07-16 PROCEDURE — 99213 OFFICE O/P EST LOW 20 MIN: CPT | Performed by: FAMILY MEDICINE

## 2025-07-16 NOTE — PROGRESS NOTES
"Virtual or Telephone Consent    An interactive audio and video telecommunication system which permits real time communications between the patient (at the originating site) and provider (at the distant site) was utilized to provide this telehealth service.   Verbal consent was requested and obtained from Parris Perla on this date, 07/16/25 for a telehealth visit and the patient's location was confirmed at the time of the visit.      Subjective   Patient ID: Parris Perla is a 55 y.o. female who presents for No chief complaint on file..  HPI  Patinet is having ear and throat pain   Feelign tired   Flu like symptosm   Sunday   Today is the worse day   She has a huge tooth abscess and sever pain started also 3 days ago   Review of Systems    Medical History[1]    Surgical History[2]   Social History[3]   Family History[4]    MEDICATIONS AND ALLERGIES:    ALLERGIES Betamethasone    MEDICATIONS   Medications Ordered Prior to Encounter[5]           Objective   Visit Vitals  OB Status Hysterectomy   Smoking Status Former          11/19/2024     2:40 PM 12/10/2024     3:05 PM 12/16/2024     2:47 PM 4/1/2025    10:55 AM   Vitals   Systolic 124      Diastolic 80      BP Location Right arm      Heart Rate 81   77   Temp 36.6 °C (97.9 °F)      Height 1.575 m (5' 2\") 1.575 m (5' 2.01\") 1.575 m (5' 2\") 1.575 m (5' 2\")   Weight (lb) 139.6 139.55 139 128   BMI 25.53 kg/m2 25.52 kg/m2 25.42 kg/m2 23.41 kg/m2   BSA (m2) 1.66 m2 1.66 m2 1.66 m2 1.59 m2   Visit Report Report  Report Report     Physical Exam    Very noticeable swelling R side of the face     Assessment & Plan  Flu-like symptoms  Patient with flu like symptoms   Recent diagnosis of tooth abscess   With face tenderness   Fever , chills   Advised to go to the ED to make sure her symptoms are not related to blood stream infection from her abscess, she is agreable and will go now.   We explained risk of abscess leading to end organ damage , disability she verbalized " understanding. Ill go to the ED now.        Tooth abscess  As above , due to systemic symptoms ,a dvised to go to the ED                       [1]   Past Medical History:  Diagnosis Date    ADHD (attention deficit hyperactivity disorder)     Bipolar 1 disorder (Multi)     Restless leg syndrome    [2]   Past Surgical History:  Procedure Laterality Date    APPENDECTOMY      BREAST CYST ASPIRATION Bilateral 2020    benign cyst aspirations     SECTION, LOW TRANSVERSE      DILATION AND CURETTAGE OF UTERUS      GALLBLADDER SURGERY      THYROIDECTOMY     [3]   Social History  Socioeconomic History    Marital status:    Tobacco Use    Smoking status: Former     Types: Cigarettes    Smokeless tobacco: Never   Vaping Use    Vaping status: Every Day    Substances: Nicotine    Devices: Pre-filled pod   Substance and Sexual Activity    Alcohol use: Yes    Drug use: Never   [4]   Family History  Problem Relation Name Age of Onset    Breast cancer Mother's Sister     [5]   Current Outpatient Medications on File Prior to Visit   Medication Sig Dispense Refill    amphetamine-dextroamphetamine (Adderall) 20 mg tablet Take 1 tablet (20 mg) by mouth 3 times a day.      betamethasone dipropionate 0.05 % cream Apply to affected areas on the arm twice daily when active as needed. Use less than 14 days per month. 45 g 0    carBAMazepine XR (TEGretol  XR) 400 mg 12 hr tablet Take 1 tablet (400 mg) by mouth 2 times a day.      gabapentin (Neurontin) 800 mg tablet Take 1 tablet (800 mg) by mouth 3 times a day.      levothyroxine (Synthroid, Levoxyl) 100 mcg tablet Take 1 tablet (100 mcg) by mouth once daily. (Patient not taking: Reported on 2025)      sodium,potassium,mag sulfates (Suprep) 17.5-3.13-1.6 gram solution Take one bottle beginning at 6pm night before procedure and then take the other bottle 5 hours before procedure time as directed per instruction sheet (Patient not taking: Reported on 2025) 1 each 0      No current facility-administered medications on file prior to visit.

## 2025-07-22 ENCOUNTER — PATIENT OUTREACH (OUTPATIENT)
Dept: PRIMARY CARE | Facility: CLINIC | Age: 55
End: 2025-07-22
Payer: COMMERCIAL

## 2025-07-22 RX ORDER — AMOXICILLIN AND CLAVULANATE POTASSIUM 875; 125 MG/1; MG/1
875 TABLET, FILM COATED ORAL 2 TIMES DAILY
COMMUNITY

## 2025-07-22 RX ORDER — OXYCODONE AND ACETAMINOPHEN 5; 325 MG/1; MG/1
2 TABLET ORAL EVERY 6 HOURS PRN
COMMUNITY

## 2025-07-22 NOTE — PROGRESS NOTES
Discharge Facility:[facility]  Discharge Diagnosis:R Mandibular Cellulitis   Admission Date:7/16/2025  Discharge Date: 7/19/2025    PCP Appointment Date:7/24/2025  Specialist Appointment Date: TBD  Hospital Encounter and Summary Linked: Yes     Hospital Encounter with ALEIDA ABDI; ANNE WELLINGTON; SARAH GRIMALDO; Anne Wellington MD; Aleida Abdi MD     See discharge assessment below for further details  Wrap Up  Wrap Up Additional Comments: -- (Parris is feeling much better, she still has some mild swelling. She will see PCP in a few days and report any concerns right away.) (7/22/2025  7:17 PM)    Engagement  Call Start Time: 1640 (7/22/2025  7:17 PM)    Medications  Medications reviewed with patient/caregiver?: Yes (7/22/2025  7:17 PM)  Is the patient having any side effects they believe may be caused by any medication additions or changes?: No (7/22/2025  7:17 PM)  Does the patient have all medications ordered at discharge?: Yes (7/22/2025  7:17 PM)  Care Management Interventions: No intervention needed (7/22/2025  7:17 PM)  Prescription Comments: -- (Augmentin 875/125 bid x 10 days, Percocet 5/325 2 q 6 hours prn) (7/22/2025  7:17 PM)  Is the patient taking all medications as directed (includes completed medication regime)?: Yes (7/22/2025  7:17 PM)  Medication Comments: -- (Parris verbalized understanding of medication) (7/22/2025  7:17 PM)    Appointments  Does the patient have a primary care provider?: Yes (7/22/2025  7:17 PM)  Care Management Interventions: Verified appointment date/time/provider (7/24/2025) (7/22/2025  7:17 PM)  Has the patient kept scheduled appointments due by today?: Yes (7/22/2025  7:17 PM)  Care Management Interventions: Advised patient to keep appointment (7/22/2025  7:17 PM)    Self Management  What is the home health agency?: -- (na) (7/22/2025  7:17 PM)  What Durable Medical Equipment (DME) was ordered?: -- (na) (7/22/2025  7:17 PM)    Patient Teaching  Does the patient have  access to their discharge instructions?: Yes (7/22/2025  7:17 PM)  Care Management Interventions: Reviewed instructions with patient (7/22/2025  7:17 PM)  What is the patient's perception of their health status since discharge?: Improving (7/22/2025  7:17 PM)  Is the patient/caregiver able to teach back the hierarchy of who to call/visit for symptoms/problems? PCP, Specialist, Home Health nurse, Urgent Care, ED, 911: Yes (7/22/2025  7:17 PM)

## 2025-07-24 ENCOUNTER — OFFICE VISIT (OUTPATIENT)
Dept: PRIMARY CARE | Facility: CLINIC | Age: 55
End: 2025-07-24
Payer: COMMERCIAL

## 2025-07-24 VITALS
WEIGHT: 115.2 LBS | HEART RATE: 89 BPM | OXYGEN SATURATION: 98 % | DIASTOLIC BLOOD PRESSURE: 74 MMHG | SYSTOLIC BLOOD PRESSURE: 118 MMHG | HEIGHT: 62 IN | BODY MASS INDEX: 21.2 KG/M2 | TEMPERATURE: 97.7 F

## 2025-07-24 DIAGNOSIS — R63.4 WEIGHT LOSS: Primary | ICD-10-CM

## 2025-07-24 DIAGNOSIS — L03.811 CELLULITIS OF HEAD (ANY PART, EXCEPT FACE): ICD-10-CM

## 2025-07-24 DIAGNOSIS — R73.02 IGT (IMPAIRED GLUCOSE TOLERANCE): ICD-10-CM

## 2025-07-24 DIAGNOSIS — D12.6 TUBULAR ADENOMA OF COLON: ICD-10-CM

## 2025-07-24 DIAGNOSIS — E04.9 GOITER: ICD-10-CM

## 2025-07-24 DIAGNOSIS — A04.72 C. DIFFICILE COLITIS: ICD-10-CM

## 2025-07-24 DIAGNOSIS — R79.89 ABNORMAL TSH: ICD-10-CM

## 2025-07-24 PROBLEM — K52.9 COLITIS: Status: ACTIVE | Noted: 2025-07-24

## 2025-07-24 LAB — POC HEMOGLOBIN A1C: 5.5 % (ref 4.2–6.5)

## 2025-07-24 PROCEDURE — 83036 HEMOGLOBIN GLYCOSYLATED A1C: CPT | Performed by: INTERNAL MEDICINE

## 2025-07-24 PROCEDURE — 3008F BODY MASS INDEX DOCD: CPT | Performed by: INTERNAL MEDICINE

## 2025-07-24 PROCEDURE — 99214 OFFICE O/P EST MOD 30 MIN: CPT | Performed by: INTERNAL MEDICINE

## 2025-07-24 NOTE — PROGRESS NOTES
"Subjective   Patient ID: Parris Perla is a 55 y.o. female who presents for Hospital Follow-up (ADDMITTED 07/16-07/19 FOR ORAL INFECTION IN CHEEK).    HPI ILL SINCE NOV 2024   SIGN C DIFF TOOK A LONG TIME TO RESOLVE  RECENTLY HAS DENTAL INFECTION SEE RESULTS   ONGOING WEIGHT LOSS  24 POUND WEIGHT LOSS SINCE DEC 2024   SAW DIETICIAN   SP PAN ENDO ADENOMA IN COLON MAY 2025     SHE IS SEEING PSYCH.  SHE IS ON MEDS INCLUDING ADDERALL     SEE LABS 7/18   HIGH TSH  AND CTR SCAN SHOWS GOITER   BOTH NEED FOLLOW UP      SAW ENT IN JAN FOR GOITER , SP FNA JAN 2025       TODAY SHE FEELS STRESSFUL , HEALTH IS OK , JAW PAIN BETTER   AT RISK FOR RECURRENT C DIFF       SHE DENIES ILLICIT DRUG USE     OARRS REVIEWED   Review of Systems    Objective   /74 (BP Location: Left arm, Patient Position: Sitting, BP Cuff Size: Adult)   Pulse 89   Temp 36.5 °C (97.7 °F) (Temporal)   Ht 1.575 m (5' 2\")   Wt 52.3 kg (115 lb 3.2 oz)   SpO2 98%   BMI 21.07 kg/m²     Physical Exam  NAD  CARD RRR  PULM CTA  ABD NEG   EXT  NL    Assessment/Plan   Diagnoses and all orders for this visit:  Weight loss  Comments:  WE DID REVIEW THE DEIFF , THYROID, C DIFF, HOWEVER I STRONGLY FEEL SHE SHOULD BE OFF THE ADDERALL I NTOLDS HER SO NEEDS TO SEE PSYCH.  Cellulitis of head (any part, except face)  Comments:  SEE LUNSFORD ON ATB WITH RISK OF C DIFF RECURRENCE  C. difficile colitis  Comments:  SEE GI NOTES  Orders:  -     C. difficile, PCR  Goiter  Comments:  SP FNA NEG HAS HASHIMOTOS  Abnormal TSH  Comments:  SEE LABS NOT NON MEDS  Orders:  -     TSH with reflex to Free T4 if abnormal; Future  Tubular adenoma of colon  Comments:  SCOPE 2025  IGT (impaired glucose tolerance)  -     Hemoglobin A1C; Future    I RECOMMEND  STOP ADDERALL ALL OF IT  REPEAT LABS 6 WEEKS  TSH   WATCH FOR RECURRENCE OF HER  C DIFF I PLACED LABS I TOLD HER          "

## 2025-07-25 LAB — TSH SERPL-ACNC: 3.14 MIU/L

## 2025-07-29 ENCOUNTER — PATIENT OUTREACH (OUTPATIENT)
Dept: PRIMARY CARE | Facility: CLINIC | Age: 55
End: 2025-07-29
Payer: COMMERCIAL

## 2025-07-29 NOTE — PROGRESS NOTES
Confirmation of at least 2 patient identifiers.    Completed telephonic follow-up with patient after recent visit with PCP    Spoke to patient during outreach call.    Patient reports feeling: Improved    Patient has questions or concerns about medications: Yes - Reviewed that was to dc carafate, which has been bothering her anyway but she did not stop taking. She will dc going forward. She had some mild loose bm and was worried from abx and starting with c diff again. She is close to finishing her dose so if only one left will complete. She will contact provider if any further issue    Have all prescribed medications been filled? Yes    Patient has necessary resources to manage their care? Yes    Patient has questions or concerns? Yes - discussed as above    Next care management follow-up approximately within one month.  Care  information provided to patient.

## 2025-08-26 ENCOUNTER — APPOINTMENT (OUTPATIENT)
Dept: PRIMARY CARE | Facility: CLINIC | Age: 55
End: 2025-08-26
Payer: COMMERCIAL

## 2025-08-26 VITALS
TEMPERATURE: 97.4 F | OXYGEN SATURATION: 96 % | RESPIRATION RATE: 18 BRPM | HEART RATE: 98 BPM | WEIGHT: 115.6 LBS | DIASTOLIC BLOOD PRESSURE: 80 MMHG | SYSTOLIC BLOOD PRESSURE: 120 MMHG | HEIGHT: 62 IN | BODY MASS INDEX: 21.27 KG/M2

## 2025-08-26 DIAGNOSIS — R63.4 WEIGHT LOSS: Primary | ICD-10-CM

## 2025-08-26 DIAGNOSIS — R79.89 ABNORMAL TSH: ICD-10-CM

## 2025-08-26 DIAGNOSIS — L03.811 CELLULITIS OF HEAD (ANY PART, EXCEPT FACE): ICD-10-CM

## 2025-08-26 DIAGNOSIS — A04.72 C. DIFFICILE COLITIS: ICD-10-CM

## 2025-08-26 PROCEDURE — 3008F BODY MASS INDEX DOCD: CPT | Performed by: INTERNAL MEDICINE

## 2025-08-26 PROCEDURE — 99213 OFFICE O/P EST LOW 20 MIN: CPT | Performed by: INTERNAL MEDICINE

## 2025-08-26 ASSESSMENT — PATIENT HEALTH QUESTIONNAIRE - PHQ9
2. FEELING DOWN, DEPRESSED OR HOPELESS: NOT AT ALL
1. LITTLE INTEREST OR PLEASURE IN DOING THINGS: NOT AT ALL
SUM OF ALL RESPONSES TO PHQ9 QUESTIONS 1 AND 2: 0

## 2025-08-29 ENCOUNTER — PATIENT OUTREACH (OUTPATIENT)
Dept: PRIMARY CARE | Facility: CLINIC | Age: 55
End: 2025-08-29
Payer: COMMERCIAL